# Patient Record
Sex: MALE | Race: WHITE
[De-identification: names, ages, dates, MRNs, and addresses within clinical notes are randomized per-mention and may not be internally consistent; named-entity substitution may affect disease eponyms.]

---

## 2019-07-28 ENCOUNTER — HOSPITAL ENCOUNTER (EMERGENCY)
Dept: HOSPITAL 54 - ER | Age: 54
Discharge: HOME | End: 2019-07-28
Payer: MEDICAID

## 2019-07-28 VITALS — WEIGHT: 210 LBS | BODY MASS INDEX: 31.83 KG/M2 | HEIGHT: 68 IN

## 2019-07-28 VITALS — SYSTOLIC BLOOD PRESSURE: 143 MMHG | DIASTOLIC BLOOD PRESSURE: 99 MMHG

## 2019-07-28 DIAGNOSIS — L03.115: Primary | ICD-10-CM

## 2019-07-28 DIAGNOSIS — Z60.2: ICD-10-CM

## 2019-07-28 DIAGNOSIS — F17.200: ICD-10-CM

## 2019-07-28 DIAGNOSIS — K21.9: ICD-10-CM

## 2019-07-28 DIAGNOSIS — I10: ICD-10-CM

## 2019-07-28 SDOH — SOCIAL STABILITY - SOCIAL INSECURITY: PROBLEMS RELATED TO LIVING ALONE: Z60.2

## 2020-05-26 ENCOUNTER — HOSPITAL ENCOUNTER (EMERGENCY)
Dept: HOSPITAL 54 - ER | Age: 55
Discharge: HOME | End: 2020-05-26
Payer: MEDICAID

## 2020-05-26 VITALS — BODY MASS INDEX: 32.96 KG/M2 | WEIGHT: 210 LBS | HEIGHT: 67 IN

## 2020-05-26 VITALS — DIASTOLIC BLOOD PRESSURE: 78 MMHG | SYSTOLIC BLOOD PRESSURE: 132 MMHG

## 2020-05-26 DIAGNOSIS — K21.9: ICD-10-CM

## 2020-05-26 DIAGNOSIS — Z60.2: ICD-10-CM

## 2020-05-26 DIAGNOSIS — S39.012A: Primary | ICD-10-CM
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
## 2020-05-26 DIAGNOSIS — I10: ICD-10-CM
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
## 2020-05-26 DIAGNOSIS — F17.200: ICD-10-CM

## 2020-05-26 SDOH — SOCIAL STABILITY - SOCIAL INSECURITY: PROBLEMS RELATED TO LIVING ALONE: Z60.2

## 2020-05-26 NOTE — NUR
BACK PAIN, WORSE FOR THE LAST 2 DAYS; DENIES FALL . PT AAOX4, VSS. RR EVEN & 
UNLABORED. DENIES ANY OTHER DISCOMFORT. PT SEEN & EVAL'D BY DR. PULIDO. 
MEDICATED FOR PAIN, PT DAVINA WELL.